# Patient Record
Sex: FEMALE | Race: WHITE | NOT HISPANIC OR LATINO
[De-identification: names, ages, dates, MRNs, and addresses within clinical notes are randomized per-mention and may not be internally consistent; named-entity substitution may affect disease eponyms.]

---

## 2017-03-21 ENCOUNTER — RESULT REVIEW (OUTPATIENT)
Age: 47
End: 2017-03-21

## 2017-04-18 ENCOUNTER — RESULT REVIEW (OUTPATIENT)
Age: 47
End: 2017-04-18

## 2017-10-11 ENCOUNTER — APPOINTMENT (OUTPATIENT)
Dept: OPHTHALMOLOGY | Facility: CLINIC | Age: 47
End: 2017-10-11
Payer: MEDICAID

## 2017-10-11 DIAGNOSIS — H53.8 OTHER VISUAL DISTURBANCES: ICD-10-CM

## 2017-10-11 PROCEDURE — 92014 COMPRE OPH EXAM EST PT 1/>: CPT

## 2017-10-11 PROCEDURE — 92015 DETERMINE REFRACTIVE STATE: CPT

## 2018-06-08 ENCOUNTER — RESULT REVIEW (OUTPATIENT)
Age: 48
End: 2018-06-08

## 2018-12-10 ENCOUNTER — APPOINTMENT (OUTPATIENT)
Dept: OPHTHALMOLOGY | Facility: CLINIC | Age: 48
End: 2018-12-10
Payer: MEDICAID

## 2018-12-10 PROCEDURE — 92012 INTRM OPH EXAM EST PATIENT: CPT

## 2018-12-10 RX ORDER — TOBRAMYCIN AND DEXAMETHASONE 3; 1 MG/G; MG/G
0.3-0.1 OINTMENT OPHTHALMIC TWICE DAILY
Qty: 1 | Refills: 2 | Status: ACTIVE | COMMUNITY
Start: 2018-12-10 | End: 1900-01-01

## 2020-10-28 ENCOUNTER — APPOINTMENT (OUTPATIENT)
Dept: OPHTHALMOLOGY | Facility: CLINIC | Age: 50
End: 2020-10-28
Payer: MEDICAID

## 2020-10-28 ENCOUNTER — NON-APPOINTMENT (OUTPATIENT)
Age: 50
End: 2020-10-28

## 2020-10-28 PROCEDURE — 99072 ADDL SUPL MATRL&STAF TM PHE: CPT

## 2020-10-28 PROCEDURE — 92014 COMPRE OPH EXAM EST PT 1/>: CPT

## 2021-05-19 ENCOUNTER — APPOINTMENT (OUTPATIENT)
Dept: ORTHOPEDIC SURGERY | Facility: CLINIC | Age: 51
End: 2021-05-19
Payer: MEDICAID

## 2021-05-19 VITALS
SYSTOLIC BLOOD PRESSURE: 130 MMHG | DIASTOLIC BLOOD PRESSURE: 78 MMHG | HEART RATE: 82 BPM | HEIGHT: 60 IN | BODY MASS INDEX: 28.47 KG/M2 | WEIGHT: 145 LBS

## 2021-05-19 DIAGNOSIS — M75.01 ADHESIVE CAPSULITIS OF RIGHT SHOULDER: ICD-10-CM

## 2021-05-19 PROCEDURE — 73030 X-RAY EXAM OF SHOULDER: CPT | Mod: RT

## 2021-05-19 PROCEDURE — 99204 OFFICE O/P NEW MOD 45 MIN: CPT | Mod: 25

## 2021-05-19 PROCEDURE — 20610 DRAIN/INJ JOINT/BURSA W/O US: CPT | Mod: RT

## 2021-05-20 PROBLEM — M75.01 ADHESIVE CAPSULITIS OF RIGHT SHOULDER: Status: ACTIVE | Noted: 2021-05-20

## 2022-12-07 ENCOUNTER — EMERGENCY (EMERGENCY)
Facility: HOSPITAL | Age: 52
LOS: 1 days | Discharge: HOME | End: 2022-12-07
Attending: EMERGENCY MEDICINE
Payer: MEDICAID

## 2022-12-07 ENCOUNTER — NON-APPOINTMENT (OUTPATIENT)
Age: 52
End: 2022-12-07

## 2022-12-07 VITALS
OXYGEN SATURATION: 96 % | TEMPERATURE: 98 F | DIASTOLIC BLOOD PRESSURE: 75 MMHG | HEART RATE: 80 BPM | SYSTOLIC BLOOD PRESSURE: 114 MMHG | RESPIRATION RATE: 18 BRPM

## 2022-12-07 VITALS
TEMPERATURE: 98 F | SYSTOLIC BLOOD PRESSURE: 141 MMHG | HEIGHT: 61 IN | DIASTOLIC BLOOD PRESSURE: 77 MMHG | WEIGHT: 139.99 LBS | OXYGEN SATURATION: 99 % | HEART RATE: 109 BPM | RESPIRATION RATE: 20 BRPM

## 2022-12-07 PROCEDURE — 73090 X-RAY EXAM OF FOREARM: CPT

## 2022-12-07 PROCEDURE — 73110 X-RAY EXAM OF WRIST: CPT

## 2022-12-07 PROCEDURE — 73130 X-RAY EXAM OF HAND: CPT

## 2022-12-07 PROCEDURE — 73110 X-RAY EXAM OF WRIST: CPT | Mod: 26,LT

## 2022-12-07 PROCEDURE — 99284 EMERGENCY DEPT VISIT MOD MDM: CPT | Mod: 25

## 2022-12-07 PROCEDURE — 29125 APPL SHORT ARM SPLINT STATIC: CPT | Mod: LT

## 2022-12-07 PROCEDURE — 73090 X-RAY EXAM OF FOREARM: CPT | Mod: 26,LT

## 2022-12-07 PROCEDURE — 73130 X-RAY EXAM OF HAND: CPT | Mod: 26,LT

## 2022-12-07 RX ORDER — ACETAMINOPHEN 500 MG
975 TABLET ORAL ONCE
Refills: 0 | Status: COMPLETED | OUTPATIENT
Start: 2022-12-07 | End: 2022-12-07

## 2022-12-07 RX ORDER — IBUPROFEN 200 MG
400 TABLET ORAL ONCE
Refills: 0 | Status: COMPLETED | OUTPATIENT
Start: 2022-12-07 | End: 2022-12-07

## 2022-12-07 RX ADMIN — Medication 400 MILLIGRAM(S): at 11:58

## 2022-12-07 RX ADMIN — Medication 975 MILLIGRAM(S): at 11:59

## 2022-12-07 NOTE — ED PROVIDER NOTE - PATIENT PORTAL LINK FT
You can access the FollowMyHealth Patient Portal offered by [facility] by registering at the following website: [email or web address]. By joining [facility]’s FollowMyHealth portal, you will also be able to view your health information using other applications (apps) compatible with our system.

## 2022-12-07 NOTE — ED PROCEDURE NOTE - NS ED PERI NEURO POS
numbness in median nerve distribution/The patient/caregiver verbalized understanding of how to care for the injured extremity with splint.

## 2022-12-07 NOTE — ED PROVIDER NOTE - NSFOLLOWUPCLINICS_GEN_ALL_ED_FT
[facility] Orthopedic Surgery  Orthopedic Surgery  [address], 3rd & [address]th [place] [address]  Phone: [phone]  Fax:     [facility]  Orthopedic Surgery  [address] [address]  Phone: [phone]  Fax:     [facility]  Orthopedic Surgery  [address] [place] [address]  Phone: [phone]  Fax:      [facility] Orth[facility]  Orthopedic Surgery  [address], 3rd & 4th [place] [address]  Phone: [phone]  Fax:     [facility]  Orthopedic Surgery  [address] [address]  Phone: [phone]  Fax:     [facility]  Orthopedic Surgery  [address] [place] [address]  Phone: [phone]  Fax:     [facility]  Sports Medicine  [address] [place] [address]  Phone: [phone]  Fax:

## 2022-12-07 NOTE — ED PROVIDER NOTE - CARE PLAN
Principal Discharge DX:	Closed fracture of left distal radius and ulna  Secondary Diagnosis:	Neurapraxia of median nerve   1

## 2022-12-07 NOTE — ED PROVIDER NOTE - NSFOLLOWUPINSTRUCTIONS_ED_ALL_ED_FT
You were found to have a fracture of your left distal radius and ulnar styloid. You likely have an injury to your median nerve in that hand.    Follow up with an orthopedic surgeon within 3 days. Call to make an appointment.    Please review material below on Splint Care and Wrist fracture below.    Splint Care    WHAT YOU NEED TO KNOW:    Splint care is important to help protect your splint until it comes off. Some splints are made of fiberglass or plaster that will need to dry and harden. Splint care will help the splint dry and harden correctly. Even after your splint hardens, it can be damaged.    DISCHARGE INSTRUCTIONS:    Return to the emergency department if:   •You have increased pain.      •Your fingers or toes are numb or tingling.      •You feel burning or stinging around your injury.      •Your nails, fingers, or toes turn pale, blue, or gray, and feel cold.      •You have new or increased trouble moving your fingers or toes.      •Your swelling gets worse.      •The skin under your splint is bleeding or leaking pus.       Contact your healthcare provider if:   •Your hard splint gets wet or is damaged.      •You have a fever.      •Your splint feels tighter.      •You have itchy, dry skin under your splint that is getting worse.      •The skin under your splint is red, or you have a new sore.      •You notice a bad smell coming from your splint.       •You have questions or concerns about your condition or care.      How to care for your splint:   •Wait for your hard splint to harden completely. You may have to wait up to 3 days before you can walk on a plaster splint.      •Check your splint and the skin around it each day. Check your splint for damage, such as cracks and breaks. Check your skin for redness, increased swelling, and sores. Loosen the elastic bandage around your splint if it feels too tight.      •Keep your splint clean and dry. Keep dirt out of your splint. Before you bathe, wrap your hard splint with 2 layers of plastic. Then put a plastic bag over it. Keep the plastic bag tightly sealed. You can also ask your healthcare provider about waterproof shields. Do not put your hard splint in the water, even with a plastic bag over it. A wet splint can make your skin itchy, and may lead to infection.      •Do not put powders or deodorants inside your splint. These can dry your skin and increase itching.       •Do not try to scratch the skin inside your hard splint with sharp objects. Sharp objects can break off inside your splint or hurt your skin.       •Do not pull the padding out of your splint. The padding inside your splint protects your skin. You may develop a sore on your skin if you take out the padding.      Follow up with your healthcare provider as directed within 1 to 2 weeks: Write down your questions so you remember to ask them during your visits.    _______________    Wrist Fracture in Adults    WHAT YOU NEED TO KNOW:    A wrist fracture is a break in one or more of the bones in your wrist.   Adult Arm Bones         DISCHARGE INSTRUCTIONS:    Return to the emergency department if:   •Your pain gets worse or does not get better after you take pain medicine.      •Your cast or splint breaks, gets wet, or is damaged.      •Your hand or fingers feel numb or cold.      •Your hand or fingers turn white or blue.      •Your splint or cast feels too tight.      •You have more pain or swelling after the cast or splint is put on.      Call your doctor if:   •You have a fever.      •There is a foul smell or blood coming from under the cast.      •You have questions or concerns about your condition or care.      Medicines: You may need any of the following:   •Prescription pain medicine may be given. Ask your healthcare provider how to take this medicine safely. Some prescription pain medicines contain acetaminophen. Do not take other medicines that contain acetaminophen without talking to your healthcare provider. Too much acetaminophen may cause liver damage. Prescription pain medicine may cause constipation. Ask your healthcare provider how to prevent or treat constipation.       •NSAIDs, such as ibuprofen, help decrease swelling, pain, and fever. NSAIDs can cause stomach bleeding or kidney problems in certain people. If you take blood thinner medicine, always ask your healthcare provider if NSAIDs are safe for you. Always read the medicine label and follow directions.      •Acetaminophen decreases pain and fever. It is available without a doctor's order. Ask how much to take and how often to take it. Follow directions. Read the labels of all other medicines you are using to see if they also contain acetaminophen, or ask your doctor or pharmacist. Acetaminophen can cause liver damage if not taken correctly.      •Take your medicine as directed. Contact your healthcare provider if you think your medicine is not helping or if you have side effects. Tell your provider if you are allergic to any medicine. Keep a list of the medicines, vitamins, and herbs you take. Include the amounts, and when and why you take them. Bring the list or the pill bottles to follow-up visits. Carry your medicine list with you in case of an emergency.      Self-care:   •Rest as much as possible. Do not play contact sports until the healthcare provider says it is okay.      •Apply ice on your wrist for 15 to 20 minutes every hour or as directed. Use an ice pack, or put crushed ice in a plastic bag. Cover it with a towel before you place it on your skin. Ice helps prevent tissue damage and decreases swelling and pain.      •Elevate your wrist above the level of your heart as often as possible. This will help decrease swelling and pain. Prop your wrist on pillows or blankets to keep it elevated comfortably.             Cast or splint care:   •You may take a bath or shower as directed. Do not let your cast or splint get wet. Before bathing, cover the cast or splint with 2 plastic trash bags. Tape the bags to your skin above the cast or splint to seal out the water. Keep your arm out of the water in case the bag breaks. If a plaster cast gets wet and soft, call your healthcare provider.      •Check the skin around the cast or splint every day. You may put lotion on any red or sore areas.      •Do not push down or lean on the cast or brace because it may break.      •Do not scratch the skin under the cast by putting a sharp or pointed object inside the cast.      Go to physical therapy as directed: You may need physical therapy after your wrist heals and the cast is removed. A physical therapist can teach you exercises to help improve movement and strength and to decrease pain.    Follow up with your doctor or bone specialist as directed: You may need to return to have your cast removed. You may also need an x-ray to check how well the bone has healed. Write down your questions so you remember to ask them during your visits.

## 2022-12-07 NOTE — ED ADULT NURSE NOTE - NSIMPLEMENTINTERV_GEN_ALL_ED
Implemented All Fall Risk Interventions:  [place] to call system. Call bell, personal items and telephone within reach. Instruct patient to call for assistance. Room bathroom lighting operational. Non-slip footwear when patient is off stretcher. Physically safe environment: no spills, clutter or unnecessary equipment. Stretcher in lowest position, wheels locked, appropriate side rails in place. Provide visual cue, wrist band, yellow gown, etc. Monitor gait and stability. Monitor for mental status changes and reorient to person, place, and time. Review medications for side effects contributing to fall risk. Reinforce activity limits and safety measures with patient and family.

## 2022-12-07 NOTE — ED PROVIDER NOTE - PHYSICAL EXAMINATION
I have reviewed the triage vital signs.  Const: AAOx3, in NAD  Eyes: no conjunctival injection  HENT: NCAT, Neck supple, oral mucosa moist  CV: tachycardic, +S1, S2  Resp: CTAB, no respiratory distress  GI: Abdomen soft, NTND, no guarding  : no CVA tenderness  Extremities: No peripheral edema,  2+ radial and DP pulses  Skin: Warm, well perfused, no rash. Ecchymosis over L wrist hematoma  MSK: Swelling over L dorsal wrist on the radial side.   TTP over L distal radius  L Hand:   No lacerations/abrasions. Swelling over L distal radius/proximal carpal bones. No snuffbox tenderness.  Symmetrically palpable radial and ulnar pulses. Capillary refill <2 seconds to all digits.   BL Intact sensation to light touch of the radial and ulnar nerves demonstrated by testing in the dorsal web space of the thumb and the lateral surface of the fifth finger. +Decreased sensation in median nerve distribution (over the distal palmar aspect of the index finger). Intact motor function of the radial, median and ulnar nerves demonstrated by wrist dorsiflexion (or thumb IP extension against resistance), palmar abduction of thumb, flexion of thumb IP and index IP, spreading of the 2nd through 5th digits     Neuro: No focal sensory (other than over L median nerve distribution distal to L wrist) or motor deficits  Psych: Appropriate mood and affect I have reviewed the triage vital signs.  Const: AAOx3, in NAD  Eyes: no conjunctival injection  HENT: NCAT, Neck supple, oral mucosa moist  CV: tachycardic, +S1, S2  Resp: CTAB, no respiratory distress  GI: Abdomen soft, NTND, no guarding  : no CVA tenderness  Extremities: No peripheral edema,  2+ radial and DP pulses  Skin: Warm, well perfused, no rash. Ecchymosis over L wrist hematoma  MSK: Swelling over L dorsal wrist on the radial side.   TTP over L distal radius  L Hand:   No lacerations/abrasions. Swelling over L distal radius/proximal carpal bones. No snuffbox tenderness.  Symmetrically palpable radial and ulnar pulses. Capillary refill <2 seconds to all digits.   BL Intact sensation to light touch of the radial and ulnar nerves demonstrated by testing in the dorsal web space of the thumb and the lateral surface of the fifth finger. +Decreased sensation in median nerve distribution (over the distal palmar aspect of the index finger). Intact motor function of the radial, median and ulnar nerves demonstrated by wrist dorsiflexion (or thumb IP extension against resistance), palmar abduction of thumb, flexion of thumb IP and index IP, spreading of the 2nd through 5th digits     Neuro: No focal sensory (other than over L median nerve distribution distal to L wrist) or motor deficits  Psych: Appropriate mood and affect      Attending note.  Patient is alert and in no acute distress.  Examination of the left upper extremity reveals no elbow tenderness or swelling.  Patient has tender swelling on the radial side of the left wrist.  There is paresthesia over the fingertips of the thumb, index, middle and ring fingers.  She has decreased active range of motion of her left wrist secondary to pain.  There is no tenderness in the hand or digits.  Patient has good capillary refill.

## 2022-12-07 NOTE — ED PROVIDER NOTE - NSFOLLOWUPCLINICSTOKEN_GEN_ALL_ED_FT
[id number]: || ||00\01||False;[id number]: || ||00\01||False;[id number]: || ||00\01||False; [id number]: || ||00\01||False;[id number]: || ||00\01||False;[id number]: || ||00\01||False;[id number]: || ||00\01||False;

## 2022-12-07 NOTE — ED PROVIDER NOTE - CLINICAL SUMMARY MEDICAL DECISION MAKING FREE TEXT BOX
Martin, PGY4 - 52F not on ac/antiplatelet p/w L wrist pain/hematoma s/p mechanical fall. Decreased sensation in median distribution to L hand. 2+ distal radial pulses with capillary refill <2s. Suspect distal radius/ulnar fx vs scaphoid fx. Plan for x-rays, pain control, anticipate splint and outpatient ortho/sports f/u Martin, PGY4 - 52F not on ac/antiplatelet p/w L wrist pain/hematoma s/p mechanical fall. Decreased sensation in median distribution to L hand. 2+ distal radial pulses with capillary refill <2s. Suspect distal radius/ulnar fx vs scaphoid fx. Plan for x-rays, pain control, anticipate splint and outpatient ortho/sports f/u     Attending note.  FOOSH injury to left wrist with swelling and tenderness.  X-ray to rule out fracture of distal radius versus scaphoid.  Analgesia.  Reassess.

## 2022-12-07 NOTE — ED PROVIDER NOTE - OBJECTIVE STATEMENT
52 female LHD past medical history of hypothyroidism, not on AC or antiplatelet, presents with left wrist pain status post fall.  Patient states that she slipped outside due to the floor being slippery.  Had a ground level fall backwards onto her buttocks.  Unsure if she tried to break her fall with her left hand.  Denies preceding lightheadedness, chest pain, shortness of breath.  Denies head strike or LOC.  Was ambulatory immediately after the event. Has some numbness to her left hand.  Otherwise denies pain or injury. 52 female LHD past medical history of hypothyroidism, not on AC or antiplatelet, presents with left wrist pain status post fall.  Patient states that she slipped outside due to the floor being slippery.  Had a ground level fall backwards onto her buttocks.  Unsure if she tried to break her fall with her left hand.  Denies preceding lightheadedness, chest pain, shortness of breath.  Denies head strike or LOC.  Was ambulatory immediately after the event. Has some numbness to her left hand.  Otherwise denies pain or injury.      Attending note.  Patient was seen in room #33 to the left.  Agree with above.  Patient slipped on wet floor today and sustained a FOOSH injury to her left wrist.  She complains of painful swelling on the radial side of the wrist as well as paresthesia in the fingertips.  She denies any other injury.  She reports prior fracture of her foot.

## 2022-12-07 NOTE — ED ADULT NURSE NOTE - OBJECTIVE STATEMENT
Female 52 years old alert and orientedx4 came in for left wrist pain s/p slipped and fall. Pt fell backward onto her buttocks. Denies head injury, lightheadedness, chest pain or sob. Denies LOC. Reports mild numbness to left hand.

## 2022-12-10 ENCOUNTER — EMERGENCY (EMERGENCY)
Facility: HOSPITAL | Age: 52
LOS: 1 days | Discharge: HOME | End: 2022-12-10
Attending: EMERGENCY MEDICINE
Payer: MEDICAID

## 2022-12-10 VITALS
OXYGEN SATURATION: 98 % | DIASTOLIC BLOOD PRESSURE: 64 MMHG | SYSTOLIC BLOOD PRESSURE: 124 MMHG | WEIGHT: 139.99 LBS | HEIGHT: 61 IN | HEART RATE: 77 BPM | RESPIRATION RATE: 18 BRPM | TEMPERATURE: 98 F

## 2022-12-10 PROCEDURE — 99283 EMERGENCY DEPT VISIT LOW MDM: CPT

## 2022-12-10 RX ORDER — ACETAMINOPHEN 500 MG
650 TABLET ORAL ONCE
Refills: 0 | Status: COMPLETED | OUTPATIENT
Start: 2022-12-10 | End: 2022-12-10

## 2022-12-10 RX ORDER — IBUPROFEN 200 MG
600 TABLET ORAL ONCE
Refills: 0 | Status: COMPLETED | OUTPATIENT
Start: 2022-12-10 | End: 2022-12-10

## 2022-12-10 RX ADMIN — Medication 600 MILLIGRAM(S): at 21:18

## 2022-12-10 RX ADMIN — Medication 650 MILLIGRAM(S): at 21:17

## 2022-12-10 NOTE — ED ADULT NURSE NOTE - NSIMPLEMENTINTERV_GEN_ALL_ED
Implemented All Universal Safety Interventions:  [place] to call system. Call bell, personal items and telephone within reach. Instruct patient to call for assistance. Room bathroom lighting operational. Non-slip footwear when patient is off stretcher. Physically safe environment: no spills, clutter or unnecessary equipment. Stretcher in lowest position, wheels locked, appropriate side rails in place.

## 2022-12-10 NOTE — ED PROVIDER NOTE - NSFOLLOWUPINSTRUCTIONS_ED_ALL_ED_FT
You have been evaluated in the Emergency Department today for left thumb pain and tingling.    Please schedule see the orthopedic physician on Monday as planned.    For pain, you can take Advil or Motrin and alternate that with Tylenol every 6 hours.    Please keep your left arm elevated as much as possible.    Return to the Emergency Department if you experience worsening or uncontrolled pain, worsening numbness/tingling, cold fingers, fevers 100.4°F or greater, or any other concerning symptoms.    Thank you for choosing us for your care.

## 2022-12-10 NOTE — ED PROCEDURE NOTE - PROCEDURE ADDITIONAL DETAILS
Pt's splint that was placed on 12/7 was too tight around her left first digit. Some of the padding was cut in order to increase circulation of the digit.

## 2022-12-10 NOTE — ED POST DISCHARGE NOTE - ADDITIONAL DOCUMENTATION
12/10/22: Call from patient's son, states that his mother had a splint placed for a wrist fracture a few days ago and is having increased pain and numbness to her fingers. Recommended prompt reevaluation. He state that they will come back to ED tonight. All questions answered. Triage made aware. -Bertha Rivera PA-C 12/10/22: Call from patient's son, states that his mother had a splint placed for a wrist fracture a few days ago and is having increased pain and numbness to her fingers. +Able to move fingers. Recommended prompt reevaluation. He state that they will come back to ED tonight. All questions answered. Triage made aware. -Bertha Rivera PA-C

## 2022-12-10 NOTE — ED PROVIDER NOTE - PATIENT PORTAL LINK FT
You can access the FollowMyHealth Patient Portal offered by [facility] by registering at the following website: [email or web address]. By joining [facility]’s FollowMyHealth portal, you will also be able to view your health information using other applications (apps) compatible with our system.

## 2022-12-10 NOTE — ED ADULT NURSE NOTE - OBJECTIVE STATEMENT
53 y/o female with PMH of hypothyroidism arrives to the ER ambulatory  complaining of L arm pain. Pt is A&Ox4, speaking coherently, states having a wrist fracture last Wed, had a cast put on placed. Yesterday R thumb started getting more swell, felling numbness, and tingling, not able to move her thumb. Pt reports feeling that the cast is too thigh around the thumb. Pt reports pain 5/10 in the pain scale. Pt denies any other complaints.  On assessment airway is patent, breathing spontaneously and unlabored. Skin is dry, warm. . Comfort and safety provided, side rails up with bed locked and in lowest position for safety. call bell within reach. [place] provided will continue to reassess. 51 y/o female with PMH of hypothyroidism arrives to the ER ambulatory complaining of L arm pain. Pt is A&Ox4, speaking coherently, states having a wrist fracture last Wed, splint on place. Yesterday L thumb started getting more swell, felling numbness, and tingling, not able to move her thumb. Pt reports feeling that the splint is too thigh around the thumb. Pt reports pain 5/10 in the pain scale. Pt denies any other complaints.  On assessment airway is patent, breathing spontaneously and unlabored. Skin is dry, warm. Comfort and safety provided, side rails up with bed locked and in lowest position for safety. call bell within reach. [place] provided will continue to reassess.

## 2022-12-10 NOTE — ED PROVIDER NOTE - OBJECTIVE STATEMENT
52F PMH hypothyroidism p/w left hand pain. Pt was found to have a left distal radius fracture on 12/7 after slipping and falling. Had a splint placed. Now presenting with left thumb pain and tingling. Denies fevers, chills, N/V.

## 2022-12-10 NOTE — ED PROVIDER NOTE - PHYSICAL EXAMINATION
PHYSICAL EXAM:  GENERAL: Sitting comfortable in bed, in no acute distress  HENMT: Atraumatic, moist mucous membranes EYES: Clear bilaterally, PERRL, EOMs intact b/l  HEART: RRR, S1/S2, no murmur   RESPIRATORY: Clear to auscultation bilaterally, no wheezes/rhonchi/rales  ABDOMEN: +BS, soft, nontender, nondistended  MUSCULOSKELETAL: Left fingers warm and pt able to move them, rom limited 2/2 to splint  NEURO: A&Ox4, sensation intact in left fingers  SKIN: Skin normal color for race, warm, dry and intact

## 2022-12-10 NOTE — ED PROVIDER NOTE - CLINICAL SUMMARY MEDICAL DECISION MAKING FREE TEXT BOX
52F PMH hypothyroidism p/w left hand pain. Vitals: unremarkable. On exam, left fingers warm and pt able to move them, rom limited 2/2 to splint, sensation intact in left fingers. Likely splint is too tight and limiting circulation. Compartment syndrome unlikely. Plan: release some of the padding, tylenol and motrin, will re-assess. If the pain and tingling persist, will remove the splint and apply a new one. 52F PMH hypothyroidism p/w left hand pain. Vitals: unremarkable. On exam, left fingers warm and pt able to move them, rom limited 2/2 to splint, sensation intact in left fingers. Likely splint is too tight and limiting circulation. Compartment syndrome unlikely. Plan: release some of the padding, tylenol and motrin, will re-assess. If the pain and tingling persist, will remove the splint and apply a new one. reassess ZR

## 2022-12-10 NOTE — ED PROVIDER NOTE - PROGRESS NOTE DETAILS
Rina Lopez M.D. (Resident Physician): Pt says her left thumb is feeling better after I cut some of the padding. Will d/c with ortho appt on Monday. Rina Lopez M.D. (Resident Physician): Pt says her left thumb is feeling better with less pain and less tingling after I cut some of the padding. Will d/c with return precautions and ortho appt on Monday.

## 2022-12-12 ENCOUNTER — APPOINTMENT (OUTPATIENT)
Dept: ORTHOPEDIC SURGERY | Facility: CLINIC | Age: 52
End: 2022-12-12

## 2022-12-12 PROCEDURE — 29075 APPL CST ELBW FNGR SHORT ARM: CPT | Mod: LT

## 2022-12-12 PROCEDURE — 73110 X-RAY EXAM OF WRIST: CPT | Mod: LT

## 2022-12-12 PROCEDURE — 99213 OFFICE O/P EST LOW 20 MIN: CPT | Mod: 25

## 2022-12-12 NOTE — DISCUSSION/SUMMARY
[de-identified] : The underlying pathophysiology was reviewed with the patient. XR films were reviewed with the patient. Discussed at length the nature of the patient’s condition. The left wrist symptoms appear secondary to distal radius fracture.\par \par At this time, given the nature of the injury as documented above, I have recommended nonoperative management consisting of cast immobilization. I did tell her that the fracture will have to be monitored through xrays as there is always a chance it can displace. Additionally, as she has numbness noted to the left thumb. this will also have to be monitored as she likely bruised the median nerve upon falling. I told her that ultimately if the numbness persists or worsens, she may require a carpal tunnel release at a later date.\par \par She was placed into a left short arm cast in the office today on 12/12/22.\par Proper cast care instructions were reviewed with the patient. \par She was instructed on ROM exercises of the shoulder, elbow, wrist and hand while casted.\par She was advised to use the hand for all ADLs as tolerated while casted.\par I recommended elevation and pumping of the hand to reduce edema if the cast begins to tighten.\par \par RX: Ibuprofen 600mg, BID (30 tablets).\par \par All questions answered, understanding verbalized. Patient in agreement with plan of care. Follow up in 2 weeks for repeat xrays in the cast.

## 2022-12-12 NOTE — PHYSICAL EXAM
[de-identified] : Patient is WDWN, alert, and in no acute distress. Breathing is unlabored. She is grossly oriented to person, place, and time.\par \par She is accompanied by her  today.\par \par Left Wrist:\par She presents in a sling and sugar tong splint, which were removed for physical exam.\par After the splint was removed, there was no evidence of blistering, lesions or skin breakdown.\par There is moderate edema, without ecchymosis.\par ROM to the wrist was not accessed given injury and pain.\par Digital motion is limited due to edema and pain.\par There is numbness noted solely to the left thumb. Sensation is intact to the other digits. [de-identified] : AP, lateral and oblique views of the LEFT wrist were obtained today and revealed a perfectly aligned distal radius fracture with intra-articular extension. There is an associated ulnar styloid fracture. \par \par ------------------------------------------------------------------------------------------------------------------------------------------------------------------------------------\par \par EXAM: XR FOREARM 2 VIEWS LT\par PROCEDURE DATE: 12/07/2022\par IMPRESSION: \par No acute bony pathology proximally.\par \par JAIME LOPEZ MD; Attending Interventional Radiologist\par This document has been electronically signed. Dec 7 2022 3:37PM\par \par ------------------------------------------------------------------------------------------------------------------------------------------------------------------------------------\par \par EXAM: XR HAND MIN 3 VIEWS LT\par PROCEDURE DATE: 12/07/2022\par IMPRESSION: \par No acute hand pathology.\par \par JAIME LOPEZ MD; Attending Interventional Radiologist\par This document has been electronically signed. Dec 7 2022 3:38PM\par \par ------------------------------------------------------------------------------------------------------------------------------------------------------------------------------------ \par \par EXAM: XR WRIST COMP MIN 3 VIEWS LT\par PROCEDURE DATE: 12/07/2022\par IMPRESSION:\par Three views of the left wrist show dorsally angulated buckle fracture of the distal radius with avulsion fracture of the ulnar styloid process. The joint spaces are maintained.\par \par JAIME LOPEZ MD; Attending Interventional Radiologist\par This document has been electronically signed. Dec 7 2022 3:36PM

## 2022-12-12 NOTE — END OF VISIT
[FreeTextEntry3] : All medical record entries made by the Scribe were at my,  Dr. Oneal Jean MD., direction and personally dictated by me on 12/12/2022. I have personally reviewed the chart and agree that the record accurately reflects my personal performance of the history, physical exam, assessment and plan.

## 2022-12-12 NOTE — HISTORY OF PRESENT ILLNESS
[de-identified] : Pt is a 51 y/o female with left distal radius fracture.  She slipped and fell outside in a store on 12/7/22.  She had pain immediately.  She went to [facility] ED where xrays revealed a right distal radius fracture and an ulnar styloid fracture.  A splint was applied and she was advised to follow up with a specialist.  She states that she continues to have severe pain.

## 2022-12-12 NOTE — ADDENDUM
[FreeTextEntry1] : I, Daylin Lucas wrote this note acting as a scribe for Dr. Oneal Jean on Dec 12, 2022.

## 2022-12-29 ENCOUNTER — APPOINTMENT (OUTPATIENT)
Dept: ORTHOPEDIC SURGERY | Facility: CLINIC | Age: 52
End: 2022-12-29

## 2022-12-29 VITALS — BODY MASS INDEX: 26.43 KG/M2 | WEIGHT: 140 LBS | HEIGHT: 61 IN

## 2022-12-29 PROCEDURE — 99213 OFFICE O/P EST LOW 20 MIN: CPT

## 2022-12-29 PROCEDURE — 73110 X-RAY EXAM OF WRIST: CPT | Mod: LT

## 2022-12-29 NOTE — ADDENDUM
[FreeTextEntry1] : I, Daylin Lucas wrote this note acting as a scribe for Dr. Oneal Jean on Dec 29, 2022.

## 2022-12-29 NOTE — END OF VISIT
[FreeTextEntry3] : All medical record entries made by the Scribe were at my,  Dr. Oneal Jean MD., direction and personally dictated by me on 12/29/2022. I have personally reviewed the chart and agree that the record accurately reflects my personal performance of the history, physical exam, assessment and plan.

## 2022-12-29 NOTE — DISCUSSION/SUMMARY
[de-identified] : The underlying pathophysiology was reviewed with the patient. XR films were reviewed with the patient. Discussed at length the nature of the patient’s condition. The left wrist symptoms appear secondary to distal radius fracture.\par \par At this time, she will continue in the short arm cast for another 3 weeks to complete the full 6 weeks of cast immobilization. She did inquire about the cast being removed today, which I told her I would absolutely not recommend given she would be at risk for displacing the fracture if she were to call without the cast on.\par \par All questions answered, understanding verbalized. Patient in agreement with plan of care. Follow up in 3 weeks for cast removal and repeat xrays.

## 2022-12-29 NOTE — HISTORY OF PRESENT ILLNESS
[de-identified] : Pt is a 51 y/o female with left distal radius fracture.  She slipped and fell outside in a store on 12/7/22.  She had pain immediately.  She went to [facility] ED where xrays revealed a right distal radius fracture and an ulnar styloid fracture.  A splint was applied and she was advised to follow up with a specialist.  She states that she continues to have severe pain. She was initially treated on 12/12/22 at which time I recommended nonoperative management and she was placed into a left short arm cast. She returns for repeat xrays in the cast on 12/29/22. She notes she was improving but as of the last week or so she has increased pain ulnarly to the hand, however this is not in the region of the fracture. She is requesting the cast to be removed today.

## 2022-12-29 NOTE — PHYSICAL EXAM
[de-identified] : Patient is WDWN, alert, and in no acute distress. Breathing is unlabored. She is grossly oriented to person, place, and time.\par \par Left Wrist:\par She presents in a left short arm cast.\par The cast is well fitting, without issue.\par Digits are moving freely, with brisk capillary refill.\par Sensation is intact to the digits. [de-identified] : AP, lateral and oblique views of the LEFT wrist were obtained today (in the short arm cast) and revealed a perfectly aligned distal radius fracture with intra-articular extension. There is an associated ulnar styloid fracture. The fracture at the distal radius has remained perfectly aligned since prior xrays.\par \par ------------------------------------------------------------------------------------------------------------------------------------------------------------------------------------\par \par EXAM: XR FOREARM 2 VIEWS LT\par PROCEDURE DATE: 12/07/2022\par IMPRESSION: \par No acute bony pathology proximally.\par \par JAIME LOPEZ MD; Attending Interventional Radiologist\par This document has been electronically signed. Dec 7 2022 3:37PM\par \par ------------------------------------------------------------------------------------------------------------------------------------------------------------------------------------\par \par EXAM: XR HAND MIN 3 VIEWS LT\par PROCEDURE DATE: 12/07/2022\par IMPRESSION: \par No acute hand pathology.\par \par JAIME LOPEZ MD; Attending Interventional Radiologist\par This document has been electronically signed. Dec 7 2022 3:38PM\par \par ------------------------------------------------------------------------------------------------------------------------------------------------------------------------------------ \par \par EXAM: XR WRIST COMP MIN 3 VIEWS LT\par PROCEDURE DATE: 12/07/2022\par IMPRESSION:\par Three views of the left wrist show dorsally angulated buckle fracture of the distal radius with avulsion fracture of the ulnar styloid process. The joint spaces are maintained.\par \par JAIME LOPEZ MD; Attending Interventional Radiologist\par This document has been electronically signed. Dec 7 2022 3:36PM

## 2023-01-09 ENCOUNTER — RX RENEWAL (OUTPATIENT)
Age: 53
End: 2023-01-09

## 2023-01-09 RX ORDER — IBUPROFEN 600 MG/1
600 TABLET, FILM COATED ORAL
Qty: 60 | Refills: 0 | Status: ACTIVE | COMMUNITY
Start: 2022-12-12 | End: 1900-01-01

## 2023-01-19 ENCOUNTER — APPOINTMENT (OUTPATIENT)
Dept: ORTHOPEDIC SURGERY | Facility: CLINIC | Age: 53
End: 2023-01-19
Payer: MEDICAID

## 2023-01-19 DIAGNOSIS — S52.502A UNSPECIFIED FRACTURE OF THE LOWER END OF LEFT RADIUS, INITIAL ENCOUNTER FOR CLOSED FRACTURE: ICD-10-CM

## 2023-01-19 PROCEDURE — 99213 OFFICE O/P EST LOW 20 MIN: CPT

## 2023-01-19 PROCEDURE — 73110 X-RAY EXAM OF WRIST: CPT | Mod: LT

## 2023-01-20 NOTE — END OF VISIT
[FreeTextEntry3] : All medical record entries made by the Scribe were at my,  Dr. Oneal Jean MD., direction and personally dictated by me on 01/19/2023. I have personally reviewed the chart and agree that the record accurately reflects my personal performance of the history, physical exam, assessment and plan.

## 2023-01-20 NOTE — HISTORY OF PRESENT ILLNESS
[de-identified] : Pt is a 53 y/o female with left distal radius fracture.  She slipped and fell outside in a store on 12/7/22.  She had pain immediately.  She went to [facility] ED where xrays revealed a right distal radius fracture and an ulnar styloid fracture.  A splint was applied and she was advised to follow up with a specialist.  She states that she continues to have severe pain. She was initially treated on 12/12/22 at which time I recommended nonoperative management and she was placed into a left short arm cast. She returns for repeat xrays in the cast on 12/29/22. She noted she was improving but as of the last week or so she has increased pain ulnarly to the hand, however this is not in the region of the fracture. She presents on 1/19/23 for repeat xrays and cast removal. She notes increased pain today which she states may be due to the rain.

## 2023-01-20 NOTE — ADDENDUM
[FreeTextEntry1] : I, Daylin Lucas wrote this note acting as a scribe for Dr. Oneal Jean on Jan 19, 2023.

## 2023-01-20 NOTE — PHYSICAL EXAM
[de-identified] : Patient is WDWN, alert, and in no acute distress. Breathing is unlabored. She is grossly oriented to person, place, and time.\par \par Left Wrist:\par She presents in a left short arm cast, which was removed in the office today on 1/19/23.\par Once the cast was removed, there was no evidence of skin breakdown.\par ROM to the wrist is limited given 6 weeks of cast immobilization.\par Digital motion is full.\par Sensation is intact to the digits distally. [de-identified] : AP, lateral and oblique views of the LEFT wrist were obtained today (in the short arm cast) and revealed a perfectly aligned distal radius fracture with intra-articular extension. There is an associated ulnar styloid fracture. The fracture at the distal radius has remained perfectly aligned since prior xrays. Fracture is healed. \par \par ------------------------------------------------------------------------------------------------------------------------------------------------------------------------------------\par \par EXAM: XR FOREARM 2 VIEWS LT\par PROCEDURE DATE: 12/07/2022\par IMPRESSION: \par No acute bony pathology proximally.\par \par JAIME LOPEZ MD; Attending Interventional Radiologist\par This document has been electronically signed. Dec 7 2022 3:37PM\par \par ------------------------------------------------------------------------------------------------------------------------------------------------------------------------------------\par \par EXAM: XR HAND MIN 3 VIEWS LT\par PROCEDURE DATE: 12/07/2022\par IMPRESSION: \par No acute hand pathology.\par \par JAIME LOPEZ MD; Attending Interventional Radiologist\par This document has been electronically signed. Dec 7 2022 3:38PM\par \par ------------------------------------------------------------------------------------------------------------------------------------------------------------------------------------ \par \par EXAM: XR WRIST COMP MIN 3 VIEWS LT\par PROCEDURE DATE: 12/07/2022\par IMPRESSION:\par Three views of the left wrist show dorsally angulated buckle fracture of the distal radius with avulsion fracture of the ulnar styloid process. The joint spaces are maintained.\par \par JAIME LOPEZ MD; Attending Interventional Radiologist\par This document has been electronically signed. Dec 7 2022 3:36PM

## 2023-01-20 NOTE — DISCUSSION/SUMMARY
[de-identified] : The underlying pathophysiology was reviewed with the patient. XR films were reviewed with the patient. Discussed at length the nature of the patient’s condition. The left wrist symptoms appear secondary to distal radius fracture.\par \par At this time, the left short arm cast was removed in the office today on 1/19/23. She was instructed to soak the hand in warm water and Epsom salts as well as on ROM exercises of the wrist and digits. I recommended she use the hand for all ADLs to aide in regaining function. \par The patient wishes to proceed with physical therapy of the left shoulder and left wrist/hand. A script was given.\par \par All questions answered, understanding verbalized. Patient in agreement with plan of care. Follow up in 6 weeks for repeat xrays, if needed.

## 2023-03-02 ENCOUNTER — APPOINTMENT (OUTPATIENT)
Dept: ORTHOPEDIC SURGERY | Facility: CLINIC | Age: 53
End: 2023-03-02

## 2023-11-15 ENCOUNTER — NON-APPOINTMENT (OUTPATIENT)
Age: 53
End: 2023-11-15

## 2023-11-15 ENCOUNTER — APPOINTMENT (OUTPATIENT)
Dept: OPHTHALMOLOGY | Facility: CLINIC | Age: 53
End: 2023-11-15
Payer: MEDICAID

## 2023-11-15 PROCEDURE — 92004 COMPRE OPH EXAM NEW PT 1/>: CPT | Mod: 25

## 2023-11-15 PROCEDURE — 92015 DETERMINE REFRACTIVE STATE: CPT | Mod: NC

## 2024-10-11 ENCOUNTER — NON-APPOINTMENT (OUTPATIENT)
Age: 54
End: 2024-10-11

## 2025-01-05 ENCOUNTER — NON-APPOINTMENT (OUTPATIENT)
Age: 55
End: 2025-01-05

## 2025-05-09 ENCOUNTER — APPOINTMENT (OUTPATIENT)
Dept: OPHTHALMOLOGY | Facility: CLINIC | Age: 55
End: 2025-05-09
Payer: MEDICAID

## 2025-05-09 ENCOUNTER — NON-APPOINTMENT (OUTPATIENT)
Age: 55
End: 2025-05-09

## 2025-05-09 PROCEDURE — 92012 INTRM OPH EXAM EST PATIENT: CPT
